# Patient Record
Sex: MALE | Race: WHITE | NOT HISPANIC OR LATINO | URBAN - METROPOLITAN AREA
[De-identification: names, ages, dates, MRNs, and addresses within clinical notes are randomized per-mention and may not be internally consistent; named-entity substitution may affect disease eponyms.]

---

## 2020-12-29 ENCOUNTER — CLINICAL SUPPORT (OUTPATIENT)
Dept: REHABILITATION | Facility: HOSPITAL | Age: 30
End: 2020-12-29
Payer: COMMERCIAL

## 2020-12-29 DIAGNOSIS — M25.60 RANGE OF MOTION DEFICIT: ICD-10-CM

## 2020-12-29 PROCEDURE — 97165 OT EVAL LOW COMPLEX 30 MIN: CPT

## 2020-12-29 PROCEDURE — L3913 HFO W/O JOINTS CF: HCPCS

## 2020-12-29 NOTE — PATIENT INSTRUCTIONS
"[facility] - OCCUPATIONAL THER[facility]  HOME EXERCISE PROGRAM     Complete the following massages for 2 minutes each, 2 x/day.                       Complete the following exercises with 10 repetitions each, 6 x/day.     AROM: DIP Flexion / Extension  Pinch middle knuckle to prevent bending. Bend end knuckle until stretch is felt.   Hold 3 seconds. Relax. Straighten finger as far as possible.    AROM: PIP Flexion / Extension  Pinch bottom knuckle  to prevent bending. Actively bend middle knuckle until stretch is felt.   Hold 3 seconds. Relax. Straighten finger as far as possible.      AROM: Isolated MCP Flexion / Extension ("Wave")   Bend only your large, bottom knuckles. Hold 3 seconds. Keep the tips of your fingers straight. Straighten fingers.    AROM: Isolated IPJ Flexion / Extension ("Hook")  Bend only your middle and end knuckles. Hold 3 seconds.   Straighten your fingers.     AROM: MCP and PIP Flexion / Extension ("Straight Fist")  Bend your bottom and middle knuckles, keeping the tips of your fingers straight. Try to touch the pads of your fingers on your palm. Hold 3 seconds. Straighten your fingers.     AROM: Composite Flexion / Extension ("Full Fist")  Bend every joint in your hand into a fist. Hold 3 seconds. Straighten your fingers.     AROM: Composte Extension ("Finger Lifts")  Lift your finger off of the table one at a time. Hold 3 seconds. Relax your finger.    AROM: Abduction / Adduction  With hand flat on table, spread all fingers apart, then bring them together as close as possible.    Copyright © I. All rights reserved.     Therapist: LILIANA Rivera CHT    "

## 2020-12-29 NOTE — PLAN OF CARE
"  [facility] Occupational Therapy  Initial Evaluation     Name: Jadiel Sanchez  Clinic Number: [id number]    Therapy Diagnosis:   Encounter Diagnosis   Name Primary?    Range of motion deficit      Physician: Valerio Salmeron MD    Physician Orders: Eval and treat, ROM  Medical Diagnosis: S63.274A (ICD-10-CM) - Dislocation of interphalangeal joint of right ring finger   Surgical Procedure and Date: 12/3/2020 right ring finger volar plate PIP arthroplasty with open reduction of dislocation and pinning  Evaluation Date: 12/29/2020  Insurance: [employer]  Insurance Authorization period Expiration: 12/31/2020     Plan of Care Certification Period: 12/29/2020 to 2/29/2021    Visit # / Visits Authortized: 1 / 20  Time In:4:00 pm  Time Out: 4:45 pm  Total Billable Time: 45 minutes    Precautions: Standard    Subjective     Involved Side: right  Dominant Side: Right  Date of Onset: 11/13/2020  Mechanism of Injury: Pt was playing softball when the ball hit the tip of his finger. Pt reports that he just thought his finger was "jammed" however his finger did not get better so he went to Dr. Salmeron for a consultation.  History of Current Condition: Pt was seen for 1 to 2 visits of therapy in [place] then his care was transferred to [facility].   Imaging:  Not available  Previous Therapy: yes    Patient's Goals for Therapy: "regain full use of right hand"    Pain:  Functional Pain Scale Rating 0-10:   2/10 on average  2/10 at best  3/10 at worst  Location: right ring finger  Description: Aching  Aggravating Factors: Flexing  Easing Factors: rest    Occupation:  manager  Working presently: employed  Duties: communication skills, light lifting    Functional Limitations/Social History:    Previous functional status includes: Independent with all ADLs.     Current FunctionalStatus   Home/Living environment : lives with roommate      Limitation of Functional Status as " follows:   ADLs/IADLs:     - Feeding:Independent    - Bathing: Independent    - Dressing/Grooming: Independent    - Driving: Independent     Leisure: Sports: Softball unable at this time      Past Medical History/Physical Systems Review:   Jadiel Sanchez  has no past medical history on file.    Jadiel Sanchez  has no past surgical history on file.    Jadiel currently has no medications in their medication list.    Review of patient's allergies indicates:  Not on File       Objective   Observation/Appearance:  Skin intact and no bruising noted. Incision site well healed. Pt states that he has an orthosis, however it is in his car and presently not wearing the orthosis due to it being too bulky.      Edema. Measured in centimeters.   12/29/2020 12/29/2020    Left Right   Ring:       P1            6.0 6.9    PIP            6.0 7.3   P2             5.1 6.5     Hand ROM. Measured in degrees.   12/29/2020 12/29/2020    Left Right             Ring:   MP 0/95 0/95              PIP 0/100 35/56              DIP 0/90 20/23              CAMARGO 285 119          Sensation: Intact     Strength (Dyanmometer) and Pinch Strength (Pinch Gauge)  Measured in pounds and psi. Average of three trials.   12/29/2020 12/29/2020    Left Right   Rung II def def       CMS Impairment/Limitation/Restriction for FOTO Hand Survey    Therapist reviewed FOTO scores for Jadiel Sanchez on 12/29/2020.   FOTO documents entered into [employer] - see Media section.    Limitation Score: 51%  Category: Carrying    Current : CK = at least 40% but < 60% impaired, limited or restricted  Goal: CJ = at least 20% but < 40% impaired, limited or restricted           Treatment     Treatment Time In: 4:00 pm  Treatment Time Out: 4:45 pm  Total Treatment time separate from Evaluation time:10 minutes    Jadiel received the following supervised modalities after being cleared for contradictions for 10 minutes:   -Patient received fluidotherapy to right hand for 10 minutes to increase  blood flow, circulation, desensitization, sensory re-education and for pain management.       Jadiel received the following manual therapy techniques for 0 minutes:   -NT    Jadiel received therapeutic exercises for 10 minutes including:  -AROM as follows: jt blocking ring finger, TGEs, finger abd/add and finger ext x 10 reps each    Fabricated custom fabricated  volar state right ring finger extension orthosis  Home Exercise Program/Education:  Issued HEP (see patient instructions in EMR) and educated on modality use for pain management . Exercises were reviewed and Jadiel was able to demonstrate them prior to the end of the session.   Pt received a written copy of exercises to perform at home. Jadiel demonstrated good  understanding of the education provided.  Pt was advised to perform these exercises free of pain, and to stop performing them if pain occurs.    Patient/Family Education: role of OT, goals for OT, scheduling/cancellations - pt verbalized understanding. Discussed insurance limitations with patient.    Additional Education provided: scar massage, coban wrap for edema management     Assessment     Jadiel Sanchez is a 30 y.o. male referred to outpatient occupational/hand therapy and presents with a medical diagnosis of right ring finger dislocation of PIP s/p volar plate arthroplasty and pinning of fracture dislocation on 12/3/2020, resulting in pain, edema, decreased range of motion, decrease functional use of right hand and demonstrates limitations as described in the chart below. Following a brief medical record review it is determined that pt will benefit from occupational therapy services in order to maximize pain free and/or functional use of right hand.     The patient's rehab potential is Excellent.     Anticipated barriers to occupational therapy: none  Pt has no cultural, educational or language barriers to learning provided.    Profile and History Assessment of Occupational Performance Level of  Clinical Decision Making Complexity Score   Occupational Profile:   Jadiel Sanchez is a 30 y.o. male who lives alone and is currently employed as a manager. Jadiel Sanchez has difficulty with playing softball,housework/household chores  affecting his/her daily functional abilities. His/her main goal for therapy is regain full use of his right hand.     Comorbidities:    has no past medical history on file.        Medical and Therapy History Review:   Brief               Performance Deficits    Physical:  Joint Mobility  Joint Stability  Muscle Power/Strength  Muscle Endurance  Skin Integrity/Scar Formation  Edema   Strength  Gross Motor Coordination  Pain    Cognitive:  No Deficits    Psychosocial:    No Deficits     Clinical Decision Making:  low    Assessment Process:  Problem-Focused Assessments    Modification/Need for Assistance:  Not Necessary    Intervention Selection:  Limited Treatment Options       low  Based on PMHX, co morbidities , data from assessments and functional level of assistance required with task and clinical presentation directly impacting function.       The following goals were discussed with the patient and patient is in agreement with them as to be addressed in the treatment plan.     Goals:   Short Term (4 weeks on 1/29/2020):  1)   Patient to be IND with HEP and modalities for pain management.  2)   Increase CAMARGO 15 to 20 degrees for right ring finger  to increase functional hand use for grasping objects.  3)   Measure  at 6 weeks post op.  4)   Decrease edema .2-.3 cm to increase joint mobility /flexibility for improved overall functional hand use.     Long Term (by discharge):  1)   Pt will report 0 out of 10 pain with right hand use.  2)   Patient to score at CJ on FOTO to demonstrate improved perception of functional right hand use.  3)   Pt will return to prior level of function for household management and playing softball.      Plan   Certification Period/Plan of care  expiration: 12/29/2020 to 2/29/2020.    Outpatient Occupational Therapy 2 times weekly for 8 weeks may include the following interventions: Paraffin, Fluidotherapy, Manual therapy/joint mobilizations, Modalities for pain management, Therapeutic exercises/activities., Strengthening, Orthotic Fabrication/Fit/Training, Edema Control and Scar Management.      Ana Patrick, OT

## 2020-12-31 ENCOUNTER — CLINICAL SUPPORT (OUTPATIENT)
Dept: REHABILITATION | Facility: HOSPITAL | Age: 30
End: 2020-12-31
Payer: COMMERCIAL

## 2020-12-31 DIAGNOSIS — M25.60 RANGE OF MOTION DEFICIT: Primary | ICD-10-CM

## 2020-12-31 PROCEDURE — 97165 OT EVAL LOW COMPLEX 30 MIN: CPT

## 2020-12-31 PROCEDURE — 97110 THERAPEUTIC EXERCISES: CPT

## 2020-12-31 NOTE — PROGRESS NOTES
Occupational Therapy Daily Treatment Note     Date: 12/31/2020  Name: Jadiel Sanchez  Clinic Number: [id number]    Therapy Diagnosis:   Encounter Diagnosis   Name Primary?    Range of motion deficit Yes     Physician: Valerio Salmeron MD  Physician Orders: Eval and treat, ROM  Medical Diagnosis: S63.274A (ICD-10-CM) - Dislocation of interphalangeal joint of right ring finger   Surgical Procedure and Date: 12/3/2020 right ring finger volar plate PIP arthroplasty with open reduction of dislocation and pinning  Evaluation Date: 12/29/2020  Insurance: Blue [employer]  Insurance Authorization period Expiration: 12/31/2020      Plan of Care Certification Period: 12/29/2020 to 2/29/2021     Visit # / Visits Authortized: 2 / 20  Time In: 2:35 pm  Time Out: 3:15 pm  Total Billable Time: 40 minutes     Precautions: Standard      Subjective     Pt reports: he was compliant with home exercise program given last session.   Response to previous treatment:more motion, decreased edema  Functional change: none    Pain: 2/10  Location: right ring finger      Objective   Edema. Measured in centimeters.    12/29/2020 12/29/2020     Left Right   Ring:         P1            6.0 6.9    PIP            6.0 7.3   P2             5.1 6.5      Hand ROM. Measured in degrees.    12/29/2020 12/29/2020     Left Right                   Ring:   MP 0/95 0/95              PIP 0/100 35/56              DIP 0/90 20/23              CAMARGO 285 119              Sensation: Intact      Strength (Dyanmometer) and Pinch Strength (Pinch Gauge)  Measured in pounds and psi. Average of three trials.    12/29/2020 12/29/2020     Left Right   Rung II def def         Jadiel received the following supervised modalities after being cleared for contradictions for 10 minutes:   -Patient received fluidotherapy to right hand for 10 minutes to increase blood flow, circulation, desensitization, sensory re-education and for pain management.        Jadiel received the following manual therapy techniques for 5 minutes:   -STM to right ring finger     Jadiel received therapeutic exercises for 15 minutes including:  -AROM as follows: jt blocking ring finger, TGEs, finger abd/add and finger ext x 10 reps each  -towel scrunches x 10 reps  -medium pom poms nesting x 1 container     Adjusted volar orthosis for better fit due to decreased edema.  Home Exercises and Education Provided     Education provided:   - none today  - Progress towards goals: excellent    Written Home Exercises Provided: Patient instructed to cont prior HEP.  Exercises were reviewed and Jadiel was able to demonstrate them prior to the end of the session.  Jadiel demonstrated good  understanding of the education provided.     See EMR under Patient Instructions for exercises provided prior visit.     Jadiel demonstrated good  understanding of the education provided.         Assessment   Jadiel Sanchez is a 30 y.o. male referred to outpatient occupational/hand therapy and presents with a medical diagnosis of right ring finger dislocation of PIP s/p volar plate arthroplasty and pinning of fracture dislocation on 12/3/2020, resulting in pain, edema, decreased range of motion, decrease functional use of right hand. Pt appeared to have more motion and less edema following treatment. Pt participated well and is motivated. Adjusted orthosis for tighter fit.    Pt would continue to benefit from skilled OT.      Jadiel is progressing well towards his goals and there are no updates to goals at this time. Pt prognosis is Excellent.     Pt will continue to benefit from skilled outpatient occupational therapy to address the deficits listed in the problem list on initial evaluation provide pt/family education and to maximize pt's level of independence in the home and community environment.     Anticipated barriers to occupational therapy: none    Pt's spiritual, cultural and educational needs considered and pt agreeable  to plan of care and goals.    Goals:  Short Term (4 weeks on 1/29/2020):  1)   Patient to be IND with HEP and modalities for pain management.- Met 12/31/2020  2)   Increase CAMARGO 15 to 20 degrees for right ring finger  to increase functional hand use for grasping objects.- progressing  3)   Measure  at 6 weeks post op.-progressing  4)   Decrease edema .2-.3 cm to increase joint mobility /flexibility for improved overall functional hand use.-progressing      Long Term (by discharge):  1)   Pt will report 0 out of 10 pain with right hand use.-progressing  2)   Patient to score at  on FOTO to demonstrate improved perception of functional right hand use.-progressing  3)   Pt will return to prior level of function for household management and playing softball.-progressing         Plan   Certification Period/Plan of care expiration: 12/29/2020 to 2/29/2020.     Outpatient Occupational Therapy 2 times weekly for 8 weeks may include the following interventions: Paraffin, Fluidotherapy, Manual therapy/joint mobilizations, Modalities for pain management, Therapeutic exercises/activities., Strengthening, Orthotic Fabrication/Fit/Training, Edema Control and Scar Management.    Discussed Plan of Care with patient: Yes  Updates/Grading for next session: Advance per protocol      Ana Patrick, OT

## 2021-01-05 ENCOUNTER — CLINICAL SUPPORT (OUTPATIENT)
Dept: REHABILITATION | Facility: HOSPITAL | Age: 31
End: 2021-01-05
Payer: COMMERCIAL

## 2021-01-05 DIAGNOSIS — M25.60 RANGE OF MOTION DEFICIT: Primary | ICD-10-CM

## 2021-01-05 PROCEDURE — 97110 THERAPEUTIC EXERCISES: CPT

## 2021-01-05 PROCEDURE — 97022 WHIRLPOOL THERAPY: CPT

## 2021-01-07 ENCOUNTER — CLINICAL SUPPORT (OUTPATIENT)
Dept: REHABILITATION | Facility: HOSPITAL | Age: 31
End: 2021-01-07
Payer: COMMERCIAL

## 2021-01-07 DIAGNOSIS — M25.60 RANGE OF MOTION DEFICIT: Primary | ICD-10-CM

## 2021-01-07 PROCEDURE — 97110 THERAPEUTIC EXERCISES: CPT

## 2021-01-07 PROCEDURE — 97022 WHIRLPOOL THERAPY: CPT

## 2021-01-13 ENCOUNTER — CLINICAL SUPPORT (OUTPATIENT)
Dept: REHABILITATION | Facility: HOSPITAL | Age: 31
End: 2021-01-13
Payer: COMMERCIAL

## 2021-01-13 DIAGNOSIS — M25.60 RANGE OF MOTION DEFICIT: Primary | ICD-10-CM

## 2021-01-13 PROCEDURE — 97110 THERAPEUTIC EXERCISES: CPT

## 2021-01-13 PROCEDURE — 97022 WHIRLPOOL THERAPY: CPT

## 2021-01-15 ENCOUNTER — CLINICAL SUPPORT (OUTPATIENT)
Dept: REHABILITATION | Facility: HOSPITAL | Age: 31
End: 2021-01-15
Payer: COMMERCIAL

## 2021-01-15 DIAGNOSIS — M25.60 RANGE OF MOTION DEFICIT: Primary | ICD-10-CM

## 2021-01-15 PROCEDURE — 97110 THERAPEUTIC EXERCISES: CPT

## 2021-01-15 PROCEDURE — 97022 WHIRLPOOL THERAPY: CPT

## 2021-01-22 ENCOUNTER — CLINICAL SUPPORT (OUTPATIENT)
Dept: REHABILITATION | Facility: HOSPITAL | Age: 31
End: 2021-01-22
Payer: COMMERCIAL

## 2021-01-22 DIAGNOSIS — M25.60 RANGE OF MOTION DEFICIT: ICD-10-CM

## 2021-01-22 PROCEDURE — 97110 THERAPEUTIC EXERCISES: CPT

## 2021-01-22 PROCEDURE — 97035 APP MDLTY 1+ULTRASOUND EA 15: CPT

## 2021-01-22 PROCEDURE — 97022 WHIRLPOOL THERAPY: CPT

## 2021-01-27 ENCOUNTER — CLINICAL SUPPORT (OUTPATIENT)
Dept: REHABILITATION | Facility: HOSPITAL | Age: 31
End: 2021-01-27
Payer: COMMERCIAL

## 2021-01-27 DIAGNOSIS — M25.60 RANGE OF MOTION DEFICIT: ICD-10-CM

## 2021-01-27 PROCEDURE — 97022 WHIRLPOOL THERAPY: CPT

## 2021-01-27 PROCEDURE — 97035 APP MDLTY 1+ULTRASOUND EA 15: CPT

## 2021-01-27 PROCEDURE — 97110 THERAPEUTIC EXERCISES: CPT

## 2021-01-27 PROCEDURE — 97140 MANUAL THERAPY 1/> REGIONS: CPT

## 2021-01-29 ENCOUNTER — CLINICAL SUPPORT (OUTPATIENT)
Dept: REHABILITATION | Facility: HOSPITAL | Age: 31
End: 2021-01-29
Payer: COMMERCIAL

## 2021-01-29 DIAGNOSIS — M25.60 RANGE OF MOTION DEFICIT: ICD-10-CM

## 2021-01-29 PROCEDURE — 97022 WHIRLPOOL THERAPY: CPT

## 2021-01-29 PROCEDURE — 97035 APP MDLTY 1+ULTRASOUND EA 15: CPT

## 2021-01-29 PROCEDURE — 97140 MANUAL THERAPY 1/> REGIONS: CPT

## 2021-01-29 PROCEDURE — 97110 THERAPEUTIC EXERCISES: CPT

## 2021-02-03 ENCOUNTER — CLINICAL SUPPORT (OUTPATIENT)
Dept: REHABILITATION | Facility: HOSPITAL | Age: 31
End: 2021-02-03
Payer: COMMERCIAL

## 2021-02-03 DIAGNOSIS — M25.60 RANGE OF MOTION DEFICIT: ICD-10-CM

## 2021-02-03 PROCEDURE — 97035 APP MDLTY 1+ULTRASOUND EA 15: CPT

## 2021-02-03 PROCEDURE — 97110 THERAPEUTIC EXERCISES: CPT

## 2021-02-05 ENCOUNTER — CLINICAL SUPPORT (OUTPATIENT)
Dept: REHABILITATION | Facility: HOSPITAL | Age: 31
End: 2021-02-05
Payer: COMMERCIAL

## 2021-02-05 DIAGNOSIS — M25.60 RANGE OF MOTION DEFICIT: ICD-10-CM

## 2021-02-05 PROCEDURE — 97022 WHIRLPOOL THERAPY: CPT

## 2021-02-05 PROCEDURE — 97035 APP MDLTY 1+ULTRASOUND EA 15: CPT

## 2021-02-05 PROCEDURE — 97140 MANUAL THERAPY 1/> REGIONS: CPT

## 2021-02-05 PROCEDURE — 97110 THERAPEUTIC EXERCISES: CPT

## 2021-02-10 ENCOUNTER — CLINICAL SUPPORT (OUTPATIENT)
Dept: REHABILITATION | Facility: HOSPITAL | Age: 31
End: 2021-02-10
Payer: COMMERCIAL

## 2021-02-10 DIAGNOSIS — M25.60 RANGE OF MOTION DEFICIT: ICD-10-CM

## 2021-02-10 PROCEDURE — 97035 APP MDLTY 1+ULTRASOUND EA 15: CPT

## 2021-02-10 PROCEDURE — 97140 MANUAL THERAPY 1/> REGIONS: CPT

## 2021-02-10 PROCEDURE — 97110 THERAPEUTIC EXERCISES: CPT

## 2021-02-12 ENCOUNTER — CLINICAL SUPPORT (OUTPATIENT)
Dept: REHABILITATION | Facility: HOSPITAL | Age: 31
End: 2021-02-12
Attending: SURGERY
Payer: COMMERCIAL

## 2021-02-12 DIAGNOSIS — M25.60 RANGE OF MOTION DEFICIT: ICD-10-CM

## 2021-02-12 PROCEDURE — 97140 MANUAL THERAPY 1/> REGIONS: CPT

## 2021-02-12 PROCEDURE — 97110 THERAPEUTIC EXERCISES: CPT

## 2021-02-12 PROCEDURE — 97035 APP MDLTY 1+ULTRASOUND EA 15: CPT

## 2021-02-26 ENCOUNTER — DOCUMENTATION ONLY (OUTPATIENT)
Dept: REHABILITATION | Facility: HOSPITAL | Age: 31
End: 2021-02-26

## 2021-04-14 ENCOUNTER — DOCUMENTATION ONLY (OUTPATIENT)
Dept: REHABILITATION | Facility: HOSPITAL | Age: 31
End: 2021-04-14